# Patient Record
Sex: FEMALE | Race: WHITE | URBAN - METROPOLITAN AREA
[De-identification: names, ages, dates, MRNs, and addresses within clinical notes are randomized per-mention and may not be internally consistent; named-entity substitution may affect disease eponyms.]

---

## 2019-02-08 LAB
ABS NEUT (OLG): 6.1 X10(3)/MCL (ref 1.5–6.9)
ALBUMIN SERPL-MCNC: 4 GM/DL (ref 3.4–5)
ALBUMIN/GLOB SERPL: 0.9 RATIO
ALP SERPL-CCNC: 78 UNIT/L (ref 30–113)
ALT SERPL-CCNC: 27 UNIT/L (ref 10–45)
AST SERPL-CCNC: 24 UNIT/L (ref 15–37)
BILIRUB SERPL-MCNC: 0.2 MG/DL (ref 0.1–0.9)
BILIRUBIN DIRECT+TOT PNL SERPL-MCNC: 0.1 MG/DL
BILIRUBIN DIRECT+TOT PNL SERPL-MCNC: 0.1 MG/DL (ref 0–0.3)
BUN SERPL-MCNC: 7 MG/DL (ref 10–20)
CALCIUM SERPL-MCNC: 10.1 MG/DL (ref 8–10.5)
CHLORIDE SERPL-SCNC: 99 MMOL/L (ref 100–108)
CO2 SERPL-SCNC: 30 MMOL/L (ref 21–35)
CREAT SERPL-MCNC: 0.58 MG/DL (ref 0.7–1.3)
ERYTHROCYTE [DISTWIDTH] IN BLOOD BY AUTOMATED COUNT: 14.9 % (ref 11.5–17)
GLOBULIN SER-MCNC: 4.7 GM/DL
GLUCOSE SERPL-MCNC: 95 MG/DL (ref 75–116)
HCT VFR BLD AUTO: 43.1 % (ref 36–48)
HGB BLD-MCNC: 13.4 GM/DL (ref 12–16)
MCH RBC QN AUTO: 28 PG (ref 27–34)
MCHC RBC AUTO-ENTMCNC: 31 GM/DL (ref 31–36)
MCV RBC AUTO: 90 FL (ref 80–99)
PLATELET # BLD AUTO: 331 X10(3)/MCL (ref 140–400)
PMV BLD AUTO: 9.2 FL (ref 6.8–10)
POTASSIUM SERPL-SCNC: 4 MMOL/L (ref 3.6–5.2)
PROT SERPL-MCNC: 8.7 GM/DL (ref 6.4–8.2)
RBC # BLD AUTO: 4.77 X10(6)/MCL (ref 4.2–5.4)
SODIUM SERPL-SCNC: 137 MMOL/L (ref 135–145)
WBC # SPEC AUTO: 8.9 X10(3)/MCL (ref 4.5–11.5)

## 2019-02-14 ENCOUNTER — HISTORICAL (OUTPATIENT)
Dept: ANESTHESIOLOGY | Facility: HOSPITAL | Age: 59
End: 2019-02-14

## 2019-05-28 ENCOUNTER — HISTORICAL (OUTPATIENT)
Dept: RADIOLOGY | Facility: HOSPITAL | Age: 59
End: 2019-05-28

## 2019-06-03 LAB
ABS NEUT (OLG): 11.5 X10(3)/MCL (ref 1.5–6.9)
ALBUMIN SERPL-MCNC: 4.2 GM/DL (ref 3.4–5)
ALBUMIN/GLOB SERPL: 1.2 RATIO
ALP SERPL-CCNC: 44 UNIT/L (ref 30–113)
ALT SERPL-CCNC: 20 UNIT/L (ref 10–45)
APTT PPP: 25.9 SECOND(S) (ref 25–35)
AST SERPL-CCNC: 18 UNIT/L (ref 15–37)
BILIRUB SERPL-MCNC: 0.3 MG/DL (ref 0.1–0.9)
BILIRUBIN DIRECT+TOT PNL SERPL-MCNC: 0.1 MG/DL (ref 0–0.3)
BILIRUBIN DIRECT+TOT PNL SERPL-MCNC: 0.2 MG/DL
BUN SERPL-MCNC: 12 MG/DL (ref 10–20)
CALCIUM SERPL-MCNC: 9.8 MG/DL (ref 8–10.5)
CHLORIDE SERPL-SCNC: 94 MMOL/L (ref 100–108)
CO2 SERPL-SCNC: 34 MMOL/L (ref 21–35)
CREAT SERPL-MCNC: 0.62 MG/DL (ref 0.7–1.3)
ERYTHROCYTE [DISTWIDTH] IN BLOOD BY AUTOMATED COUNT: 14.7 % (ref 11.5–17)
GLOBULIN SER-MCNC: 3.4 GM/DL
GLUCOSE SERPL-MCNC: 88 MG/DL (ref 75–116)
HCT VFR BLD AUTO: 40.9 % (ref 36–48)
HGB BLD-MCNC: 13.4 GM/DL (ref 12–16)
INR PPP: 1 (ref 0–1.2)
MCH RBC QN AUTO: 30 PG (ref 27–34)
MCHC RBC AUTO-ENTMCNC: 33 GM/DL (ref 31–36)
MCV RBC AUTO: 92 FL (ref 80–99)
PLATELET # BLD AUTO: 432 X10(3)/MCL (ref 140–400)
PMV BLD AUTO: 8.7 FL (ref 6.8–10)
POTASSIUM SERPL-SCNC: 3.5 MMOL/L (ref 3.6–5.2)
PROT SERPL-MCNC: 7.6 GM/DL (ref 6.4–8.2)
PROTHROMBIN TIME: 10.3 SECOND(S) (ref 9–12)
RBC # BLD AUTO: 4.43 X10(6)/MCL (ref 4.2–5.4)
SODIUM SERPL-SCNC: 133 MMOL/L (ref 135–145)
WBC # SPEC AUTO: 15.7 X10(3)/MCL (ref 4.5–11.5)

## 2019-06-04 ENCOUNTER — HISTORICAL (OUTPATIENT)
Dept: ANESTHESIOLOGY | Facility: HOSPITAL | Age: 59
End: 2019-06-04

## 2019-06-14 ENCOUNTER — HISTORICAL (OUTPATIENT)
Dept: RADIOLOGY | Facility: HOSPITAL | Age: 59
End: 2019-06-14

## 2019-08-06 ENCOUNTER — HISTORICAL (OUTPATIENT)
Dept: RADIOLOGY | Facility: HOSPITAL | Age: 59
End: 2019-08-06

## 2019-08-06 ENCOUNTER — HISTORICAL (OUTPATIENT)
Dept: RADIATION THERAPY | Facility: HOSPITAL | Age: 59
End: 2019-08-06

## 2019-08-06 LAB — POC CREATININE: 0.4 MG/DL (ref 0.6–1.3)

## 2019-08-16 ENCOUNTER — HISTORICAL (OUTPATIENT)
Dept: RADIATION THERAPY | Facility: HOSPITAL | Age: 59
End: 2019-08-16

## 2019-08-20 ENCOUNTER — HISTORICAL (OUTPATIENT)
Dept: RADIATION THERAPY | Facility: HOSPITAL | Age: 59
End: 2019-08-20

## 2019-08-22 ENCOUNTER — HISTORICAL (OUTPATIENT)
Dept: RADIATION THERAPY | Facility: HOSPITAL | Age: 59
End: 2019-08-22

## 2019-08-26 ENCOUNTER — HISTORICAL (OUTPATIENT)
Dept: RADIATION THERAPY | Facility: HOSPITAL | Age: 59
End: 2019-08-26

## 2019-08-28 ENCOUNTER — HISTORICAL (OUTPATIENT)
Dept: RADIATION THERAPY | Facility: HOSPITAL | Age: 59
End: 2019-08-28

## 2019-08-30 ENCOUNTER — HISTORICAL (OUTPATIENT)
Dept: RADIATION THERAPY | Facility: HOSPITAL | Age: 59
End: 2019-08-30

## 2019-09-12 ENCOUNTER — HISTORICAL (OUTPATIENT)
Dept: RADIATION THERAPY | Facility: HOSPITAL | Age: 59
End: 2019-09-12

## 2022-04-11 ENCOUNTER — HISTORICAL (OUTPATIENT)
Dept: ADMINISTRATIVE | Facility: HOSPITAL | Age: 62
End: 2022-04-11

## 2022-04-28 VITALS
SYSTOLIC BLOOD PRESSURE: 128 MMHG | DIASTOLIC BLOOD PRESSURE: 79 MMHG | BODY MASS INDEX: 17.44 KG/M2 | HEIGHT: 55 IN | WEIGHT: 75.38 LBS

## 2022-04-30 NOTE — OP NOTE
Patient:   Radha Little             MRN: [id number]            FIN: [id number]               Age:   58 years     Sex:  Female     :  1960   Associated Diagnoses:   None   Author:   Paul De Luna MD      Date of procedure: 2019    Preoperative diagnosis: Bilateral chronic otitis externa and chronic otitis media.  History of nasopharyngeal carcinoma.    Postoperative diagnosis: Same.    Procedure: Examination of both ears under anesthesia with debridement and cleaning of both ears with the use of the operating microscope.  Diagnostic nasal endoscopy.    Surgeon: Paul De Luna M.D.    Findings: On the nasal endoscopy the patient did not have any recurrent nasopharyngeal masses or lesions.  No purulent drainage.  No intranasal masses.  In the left ear the external auditory canal was severely inflamed with a large amount of debris and purulent drainage.  This debris in the ear canal was very hard and brittle and was unable to be determined if it was necrotic bone fragments or concretions secondary to the purulent drainage.  Tympanic membrane was difficult to visualize after cleaning.  It was difficult to determine if she had a perforation involving the eardrum.  The tympanic membrane was erythematous.  In the right ear there was some purulent drainage and some debris similar to the left but there was much less debris.  This year had been cleaned a few days previously in the office.  Patient did have a tympanic membrane perforation compromising approximately 5% of the pars tensa with mucopurulent drainage from the middle ear space.    Specimens: Drainage from the left external auditory canal submitted for Gram stain, routine culture and sensitivity, anaerobic culture and sensitivity, and fungal smear and culture.  The concretions/debris was also submitted for pathologic evaluation.    Estimated blood loss: Less than 5 cc    Complications: None    Drains: None    Anesthesia: General  intravenous anesthesia.    Indications: Please see history and physical exam    Procedure: The patient was brought to the operating room and placed on the operating room table in supine position after which she underwent general intravenous anesthesia.  Nasal endoscopy was carried out first.  This was done using a 0 degree 4 mm nasal endoscope.  Both the right and left nasal cavities as well as the nasopharynx were examined.  Examination of the nasopharynx and both nasal cavities did not show any nasopharyngeal masses or evidence of recurrent nasopharyngeal carcinoma.  Eustachian tubes were unobstructed.  Next attention was turned to the ears.  The left ear was addressed first.  The operating microscope was brought into position at the site of the table and used for the procedure.  Speculum was inserted into the external auditory canal.  Canal did have a fair amount of purulent drainage which was obtained for the above-mentioned studies with a culturette.  The external auditory canal was severely inflamed with market edema and erythema.  The purulent drainage was evacuated.  The patient had a large amount of very hard debris present in the external auditory canal medial to the osteocartilaginous junction.  It was difficult to determine if this was fragments of necrotic bone or concretion secondary to the patient's drainage.  This debris was removed using alligator forceps.  This was somewhat difficult and that some of these were very tight in the ear canal and did cause some superficial abrasions of the canal as they were removed.  Some of this material was submitted for pathologic evaluation.  The tympanic membrane was markedly erythematous but it was difficult to visualize completely and I was unable to determine if the tympanic membrane perforation was present.  After the ear was cleaned ofloxacin otic suspension was placed in the canal and cotton placed in the meatus.  Next attention was turned to the right ear.   The right ear had been cleaned a few days of prior to this appointment.  The ear was improved as compared to its appearance when she was in the office.  Ear canal was still moderately inflamed with mild to moderate edema and moderate erythema.  She had a small amount of drainage in this year but this was minimal.  She had some residual concretions or bony fragments present in the ear canal which were debrided with alligator forceps.  1 of these pieces was not able to be removed.  It may have been an exposed piece of bone.  The tympanic membrane was noted to be moderately erythematous and thickened.  She did have a 5% central perforation involving the pars tensa with mucopurulent drainage from the middle ear space.  The previously placed tympanostomy tube was not visible.  After this ear was cleaned ofloxacin otic suspension was placed in the canal and cotton placed in the meatus.  The procedure was then terminated at this point the patient was awoken and brought back to her room in stable condition.  She tolerated the procedure well.  She will be placed on ciprofloxacin 500 mg p.o. twice daily for 10 days and continued on her Cortisporin otic suspension drops which she had been placed on preoperatively.    CC: Dr. Walter Hunter

## 2022-04-30 NOTE — H&P
CHIEF COMPLAINT:  Nasopharyngeal mass.    HISTORY:  This patient is a 58-year-old white female who has a history of stage IV nasopharyngeal squamous cell carcinoma which had been treated with radiation therapy approximately 18 years ago through [facility].  The patient has been seen periodically by me primarily for complaints in relation to her ears.  She had developed some problems with facial pain following her treatments and she had indicated that this had become worse recently and a little bit more severe and frequent.  She was concerned that this may be due to chronic sinusitis.  Of note, the patient has never had apparent recurrence of her nasopharyngeal carcinoma.     A CT scan of the sinuses had been ordered recently and results did show the presence of what appeared to be an indiscrete mass in the nasopharyngeal region with some erosion of the skull base.       On physical examination in the office with nasal endoscopy, the patient was found to have some granulation appearing type tissue present in the nasopharynx which had not been noted previously.     On February 14 of this year, the patient had been brought to the operating room for debridement of her ears due to bilateral chronic otitis externa.  At that time, she had nasal endoscopy performed and there did not appear to be any apparent nasopharyngeal masses or lesions.     The patient did have MRI scan of the brain, I believe, in 2012, which did show findings of soft tissue lesion within the skull base with some erosion of the skull base, but PET scan at that time did not show any evidence of any hypermetabolic lesions in the skull base or elsewhere in the body.     The patient has been somewhat inconsistent with her followup here and through [facility].     The patient does continue to smoke cigarettes.  She has a greater than 40 pack-year history of smoking and currently smokes approximately 1/2 pack of  cigarettes per day.  She is being admitted to the hospital at this time for nasal endoscopy and nasopharyngoscopy with biopsy.    PAST MEDICAL HISTORY:  Significant for stage IV nasopharyngeal squamous cell carcinoma treated with radiation therapy approximately 18 years ago through [facility].  History of anemia.  Bilateral chronic otitis externa.    SURGERIES:  Status post cholecystectomy.  Status post right tympanostomy tube placement and left myringotomy on February 5, 2012.    MEDICATIONS:  Trazodone 100 mg p.o. q.p.m., omeprazole 20 mg p.o. q.a.m., Duloxetine 60 mg p.o. b.i.d., alendronate 70 mg p.o. weekly.    ALLERGIES:  DRUG ALLERGIES:  NO KNOWN DRUG ALLERGIES.      REVIEW OF SYSTEMS:  Unremarkable except as mentioned above.       Of note, the patient does have a long history of trismus since undergoing her treatment for her nasopharyngeal carcinoma.  This does make it difficult for her to eat.    SOCIAL HISTORY:  The patient is  and disabled.  Tobacco use as mentioned above.  She denies use of alcohol.    FAMILY HISTORY:  Unremarkable for bleeding diathesis or anesthetic complications.  There is a family history of heart disease, hypertension, cancer, diabetes, and stroke.    PHYSICAL EXAM:  VITAL SIGNS:  Weight 77 pounds.  Blood pressure 115/76, pulse 83, temperature 36.7 degrees Celsius, respirations 18 and unlabored.   GENERAL:  Thin white female in no acute distress.  Her voice is hyper-nasal.     HEENT:  Head and face normocephalic without facial lesions.  Ears:  Pinnae of both ears are normally developed.  External auditory canals are dry with some debris, which was evacuated.  Tympanic membranes are nonerythematous.     NOSE:  Nasal dorsum is unremarkable.  No septal deviation.  Mild congestion with mucoid secretions.  Oral cavity and oropharynx, tongue and floor of mouth are unremarkable.  In the posterior pharynx, she does appear to have granulation or mass which can be seen  protruding just below the inferior edge of the soft palate from the nasopharynx.     NECK:  Supple without palpable adenopathy or thyromegaly.  Trachea is in the midline.  Parotid and submandibular glands are normal to palpation.     CHEST:  Clear.     CARDIOVASCULAR:  Regular rate and rhythm without murmurs.   ABDOMEN:  Nondistended.     EXTREMITIES:  No cyanosis, clubbing, or edema.     NEUROLOGIC:  Alert.  Cranial nerves 2 through 12 intact.  Motor and sensory grossly intact.    IMPRESSION:  Possible nasopharyngeal mass in a patient with a history of nasopharyngeal carcinoma.    PLAN:  The patient is being admitted to the hospital at this time for nasopharyngeal endoscopy and nasal endoscopy with nasopharyngeal biopsy.        ALONZO/KRYS   DD: 06/04/2019 0838   DT: 06/04/2019 0908  Job # [id number]    cc: Kristopher Hunter M.D.

## 2022-04-30 NOTE — OP NOTE
Patient:   Radha Little             MRN: [id number]            FIN: [id number]               Age:   58 years     Sex:  Female     :  1960   Associated Diagnoses:   History of malignant neoplasm of nasopharynx; Neoplasm of uncertain behavior of nasopharynx; Tobacco abuse   Author:   Paul De Luna MD      Operative Note   Operative Information   Date/ Time:  2019 10:09:00.     Procedures Performed: Procedure Code   Nasal/sinus endoscopy, surgical; with biopsy of nasopharynx (CPT4 35709) performed by Paul De Luna MD on 2019 at 58 Years.  Associated diagnoses are History of malignant neoplasm of nasopharynx, Neoplasm of uncertain behavior of nasopharynx, and Tobacco abuse..     Preoperative Diagnosis: History of malignant neoplasm of nasopharynx (CBL15-IJ Z85.819), Neoplasm of uncertain behavior of nasopharynx (VXP29-OE D37.05), Tobacco abuse (WEX40-BU Z72.0).     Postoperative Diagnosis: History of malignant neoplasm of nasopharynx (EZC36-CE Z85.819), Neoplasm of uncertain behavior of nasopharynx (CZO13-GH D37.05), Tobacco abuse (ANJ11-BS Z72.0).       Surgeon: Paul De Luna M.D.    Findings: Patient had an exophytic and infiltrative somewhat vascular mass involving the entire posterior wall of the nasopharynx with extension onto the left lateral wall and also onto the roof of the nasopharynx on the left.  Lesion was more prominent on the left with a fairly large exophytic polypoid area which was fairly vascular.  Patient's eustachian tube orifice was not really identifiable on either side.  There was some extension of the mass on the left knee on the inferior border of the soft palate with the lesion also being visible upon examination through the oropharynx.    Specimens: Multiple biopsies from the nasopharynx.    Estimated blood loss: Less than 25 cc.    Complications: None.    Drains: None.    Anesthesia: General endotracheal anesthesia.    Indications: Please see history and  physical exam    Procedure: Patient was brought to the operating room and placed on the operating room table in supine position after which general endotracheal anesthesia was induced.  Patient was then prepped and draped in usual fashion.  A 0 degree 4 mm nasal endoscope was used for the procedure.  The endoscope was inserted into the left nasal cavity and advanced to the nasopharynx where examination showed the above-mentioned findings.  Largest part of the lesion was located along the left lateral wall and posterior wall of the nasopharynx.  As mentioned above the eustachian tube orifice was not identifiable on either side.  The lesion was then infiltrated with 2% Xylocaine with 1 100,000 epinephrine for hemostasis.  The left lateral wall of the nasopharynx and posterior nasal wall were also infiltrated for hemostasis.  Approximately 5 minutes were allowed to elapse.  Under endoscopic visualization multiple biopsies were then taken from the nasopharyngeal mass for diagnostic purposes.  This was also done to help debulk the lesion.  The inferior edge of the mass was also approached by introducing the endoscope into the oral cavity and the oropharynx where the inferior edge of the mass could be visualized as it extended just below the posterior edge of the soft palate.  Multiple biopsies were taken from this area also in an attempt to debulk the mass.  Following this surgical patties saturated with oxymetazoline were then placed in the nasopharynx for hemostasis.  They were left in place for approximately 5 minutes.  After they were removed the nasopharynx was then examined and patient was only found to have a small amount of bloody ooze from the biopsy sites.  It should be mentioned that examination of both nasal cavities did not show extension of the lesion into the nasal cavity on either side.  At this point the procedure was terminated.  The patient was then awoken and brought to the recovery room in stable  condition.  She tolerated the procedure well.  She also would mention the palpation of the neck was unremarkable for any palpable adenopathy.    CC: Dr. Kristopher Hunter  .